# Patient Record
Sex: FEMALE | Race: WHITE | NOT HISPANIC OR LATINO | Employment: UNEMPLOYED | URBAN - METROPOLITAN AREA
[De-identification: names, ages, dates, MRNs, and addresses within clinical notes are randomized per-mention and may not be internally consistent; named-entity substitution may affect disease eponyms.]

---

## 2024-10-23 ENCOUNTER — HOSPITAL ENCOUNTER (EMERGENCY)
Facility: HOSPITAL | Age: 54
End: 2024-10-24
Attending: EMERGENCY MEDICINE

## 2024-10-23 VITALS
OXYGEN SATURATION: 98 % | RESPIRATION RATE: 17 BRPM | TEMPERATURE: 98.1 F | HEART RATE: 81 BPM | DIASTOLIC BLOOD PRESSURE: 60 MMHG | SYSTOLIC BLOOD PRESSURE: 136 MMHG

## 2024-10-23 DIAGNOSIS — R56.9 SEIZURES (HCC): ICD-10-CM

## 2024-10-23 DIAGNOSIS — F10.929 ALCOHOL INTOXICATION (HCC): Primary | ICD-10-CM

## 2024-10-23 DIAGNOSIS — Z87.898 HISTORY OF SEIZURES: ICD-10-CM

## 2024-10-23 DIAGNOSIS — Z00.8 MEDICAL CLEARANCE FOR PSYCHIATRIC ADMISSION: ICD-10-CM

## 2024-10-23 DIAGNOSIS — F41.9 ANXIETY: ICD-10-CM

## 2024-10-23 LAB — ETHANOL EXG-MCNC: 0.23 MG/DL

## 2024-10-23 PROCEDURE — 99285 EMERGENCY DEPT VISIT HI MDM: CPT | Performed by: EMERGENCY MEDICINE

## 2024-10-23 PROCEDURE — 82075 ASSAY OF BREATH ETHANOL: CPT

## 2024-10-23 PROCEDURE — 99284 EMERGENCY DEPT VISIT MOD MDM: CPT

## 2024-10-23 RX ORDER — LORAZEPAM 1 MG/1
1 TABLET ORAL ONCE
Status: COMPLETED | OUTPATIENT
Start: 2024-10-23 | End: 2024-10-23

## 2024-10-23 RX ADMIN — LORAZEPAM 1 MG: 1 TABLET ORAL at 23:42

## 2024-10-23 NOTE — Clinical Note
Tracee COOPER Walters should be transferred out to [facility]  and has been medically cleared.

## 2024-10-24 ENCOUNTER — HOSPITAL ENCOUNTER (INPATIENT)
Facility: HOSPITAL | Age: 54
LOS: 4 days | Discharge: HOME | DRG: 753 | End: 2024-10-28
Attending: STUDENT IN AN ORGANIZED HEALTH CARE EDUCATION/TRAINING PROGRAM | Admitting: STUDENT IN AN ORGANIZED HEALTH CARE EDUCATION/TRAINING PROGRAM
Payer: COMMERCIAL

## 2024-10-24 DIAGNOSIS — R56.9 SEIZURES (HCC): ICD-10-CM

## 2024-10-24 DIAGNOSIS — Z00.8 MEDICAL CLEARANCE FOR PSYCHIATRIC ADMISSION: ICD-10-CM

## 2024-10-24 DIAGNOSIS — F39 MOOD DISORDER (HCC): Primary | ICD-10-CM

## 2024-10-24 DIAGNOSIS — Z72.0 TOBACCO USE: ICD-10-CM

## 2024-10-24 DIAGNOSIS — Z87.898 HISTORY OF SEIZURES: ICD-10-CM

## 2024-10-24 PROBLEM — F41.9 ANXIETY DISORDER: Status: ACTIVE | Noted: 2024-10-24

## 2024-10-24 LAB
25(OH)D3 SERPL-MCNC: 30.3 NG/ML (ref 30–100)
ALBUMIN SERPL BCG-MCNC: 4.4 G/DL (ref 3.5–5)
ALP SERPL-CCNC: 106 U/L (ref 34–104)
ALT SERPL W P-5'-P-CCNC: 8 U/L (ref 7–52)
AMPHETAMINES SERPL QL SCN: NEGATIVE
ANION GAP SERPL CALCULATED.3IONS-SCNC: 6 MMOL/L (ref 4–13)
AST SERPL W P-5'-P-CCNC: 13 U/L (ref 13–39)
BACTERIA UR QL AUTO: NORMAL /HPF
BARBITURATES UR QL: NEGATIVE
BASOPHILS # BLD AUTO: 0.06 THOUSANDS/ΜL (ref 0–0.1)
BASOPHILS NFR BLD AUTO: 1 % (ref 0–1)
BENZODIAZ UR QL: NEGATIVE
BILIRUB SERPL-MCNC: 0.29 MG/DL (ref 0.2–1)
BILIRUB UR QL STRIP: NEGATIVE
BUN SERPL-MCNC: 10 MG/DL (ref 5–25)
CALCIUM SERPL-MCNC: 8.9 MG/DL (ref 8.4–10.2)
CHLORIDE SERPL-SCNC: 104 MMOL/L (ref 96–108)
CLARITY UR: CLEAR
CO2 SERPL-SCNC: 31 MMOL/L (ref 21–32)
COCAINE UR QL: NEGATIVE
COLOR UR: ABNORMAL
CREAT SERPL-MCNC: 0.5 MG/DL (ref 0.6–1.3)
EOSINOPHIL # BLD AUTO: 0.05 THOUSAND/ΜL (ref 0–0.61)
EOSINOPHIL NFR BLD AUTO: 1 % (ref 0–6)
ERYTHROCYTE [DISTWIDTH] IN BLOOD BY AUTOMATED COUNT: 19 % (ref 11.6–15.1)
EST. AVERAGE GLUCOSE BLD GHB EST-MCNC: 111 MG/DL
ETHANOL EXG-MCNC: 0 MG/DL
FENTANYL UR QL SCN: NEGATIVE
FOLATE SERPL-MCNC: 12.3 NG/ML
GFR SERPL CREATININE-BSD FRML MDRD: 108 ML/MIN/1.73SQ M
GLUCOSE SERPL-MCNC: 105 MG/DL (ref 65–140)
GLUCOSE UR STRIP-MCNC: NEGATIVE MG/DL
HBA1C MFR BLD: 5.5 %
HCT VFR BLD AUTO: 37.7 % (ref 34.8–46.1)
HGB BLD-MCNC: 11 G/DL (ref 11.5–15.4)
HGB UR QL STRIP.AUTO: NEGATIVE
HYDROCODONE UR QL SCN: NEGATIVE
IMM GRANULOCYTES # BLD AUTO: 0 THOUSAND/UL (ref 0–0.2)
IMM GRANULOCYTES NFR BLD AUTO: 0 % (ref 0–2)
KETONES UR STRIP-MCNC: NEGATIVE MG/DL
LEUKOCYTE ESTERASE UR QL STRIP: NEGATIVE
LYMPHOCYTES # BLD AUTO: 1.73 THOUSANDS/ΜL (ref 0.6–4.47)
LYMPHOCYTES NFR BLD AUTO: 38 % (ref 14–44)
MCH RBC QN AUTO: 24.8 PG (ref 26.8–34.3)
MCHC RBC AUTO-ENTMCNC: 29.2 G/DL (ref 31.4–37.4)
MCV RBC AUTO: 85 FL (ref 82–98)
METHADONE UR QL: NEGATIVE
MONOCYTES # BLD AUTO: 0.51 THOUSAND/ΜL (ref 0.17–1.22)
MONOCYTES NFR BLD AUTO: 11 % (ref 4–12)
NEUTROPHILS # BLD AUTO: 2.23 THOUSANDS/ΜL (ref 1.85–7.62)
NEUTS SEG NFR BLD AUTO: 49 % (ref 43–75)
NITRITE UR QL STRIP: NEGATIVE
NON-SQ EPI CELLS URNS QL MICRO: NORMAL /HPF
NRBC BLD AUTO-RTO: 0 /100 WBCS
OPIATES UR QL SCN: NEGATIVE
OXYCODONE+OXYMORPHONE UR QL SCN: NEGATIVE
PCP UR QL: NEGATIVE
PH UR STRIP.AUTO: 7 [PH]
PLATELET # BLD AUTO: 322 THOUSANDS/UL (ref 149–390)
PMV BLD AUTO: 8.9 FL (ref 8.9–12.7)
POTASSIUM SERPL-SCNC: 4.4 MMOL/L (ref 3.5–5.3)
PROT SERPL-MCNC: 7 G/DL (ref 6.4–8.4)
PROT UR STRIP-MCNC: ABNORMAL MG/DL
RBC # BLD AUTO: 4.43 MILLION/UL (ref 3.81–5.12)
RBC #/AREA URNS AUTO: NORMAL /HPF
SODIUM SERPL-SCNC: 141 MMOL/L (ref 135–147)
SP GR UR STRIP.AUTO: 1.02 (ref 1–1.03)
THC UR QL: NEGATIVE
TSH SERPL DL<=0.05 MIU/L-ACNC: 1.22 UIU/ML (ref 0.45–4.5)
TSH SERPL DL<=0.05 MIU/L-ACNC: 1.27 UIU/ML (ref 0.45–4.5)
UROBILINOGEN UR STRIP-ACNC: <2 MG/DL
VIT B12 SERPL-MCNC: 112 PG/ML (ref 180–914)
WBC # BLD AUTO: 4.58 THOUSAND/UL (ref 4.31–10.16)
WBC #/AREA URNS AUTO: NORMAL /HPF

## 2024-10-24 PROCEDURE — 36415 COLL VENOUS BLD VENIPUNCTURE: CPT

## 2024-10-24 PROCEDURE — 99245 OFF/OP CONSLTJ NEW/EST HI 55: CPT | Performed by: PSYCHIATRY & NEUROLOGY

## 2024-10-24 PROCEDURE — 82306 VITAMIN D 25 HYDROXY: CPT | Performed by: PSYCHIATRY & NEUROLOGY

## 2024-10-24 PROCEDURE — 80053 COMPREHEN METABOLIC PANEL: CPT

## 2024-10-24 PROCEDURE — 82075 ASSAY OF BREATH ETHANOL: CPT | Performed by: EMERGENCY MEDICINE

## 2024-10-24 PROCEDURE — 82746 ASSAY OF FOLIC ACID SERUM: CPT | Performed by: PSYCHIATRY & NEUROLOGY

## 2024-10-24 PROCEDURE — 83036 HEMOGLOBIN GLYCOSYLATED A1C: CPT | Performed by: PSYCHIATRY & NEUROLOGY

## 2024-10-24 PROCEDURE — 81001 URINALYSIS AUTO W/SCOPE: CPT

## 2024-10-24 PROCEDURE — 80307 DRUG TEST PRSMV CHEM ANLYZR: CPT

## 2024-10-24 PROCEDURE — 85025 COMPLETE CBC W/AUTO DIFF WBC: CPT

## 2024-10-24 PROCEDURE — 84443 ASSAY THYROID STIM HORMONE: CPT

## 2024-10-24 PROCEDURE — 82607 VITAMIN B-12: CPT | Performed by: PSYCHIATRY & NEUROLOGY

## 2024-10-24 PROCEDURE — 84443 ASSAY THYROID STIM HORMONE: CPT | Performed by: PSYCHIATRY & NEUROLOGY

## 2024-10-24 RX ORDER — PROPRANOLOL HYDROCHLORIDE 10 MG/1
10 TABLET ORAL EVERY 8 HOURS PRN
Status: CANCELLED | OUTPATIENT
Start: 2024-10-24

## 2024-10-24 RX ORDER — LEVETIRACETAM 500 MG/1
500 TABLET ORAL ONCE
Status: COMPLETED | OUTPATIENT
Start: 2024-10-24 | End: 2024-10-24

## 2024-10-24 RX ORDER — TRAZODONE HYDROCHLORIDE 50 MG/1
50 TABLET, FILM COATED ORAL
Status: CANCELLED | OUTPATIENT
Start: 2024-10-24

## 2024-10-24 RX ORDER — BENZTROPINE MESYLATE 1 MG/1
1 TABLET ORAL
Status: DISCONTINUED | OUTPATIENT
Start: 2024-10-24 | End: 2024-10-28 | Stop reason: HOSPADM

## 2024-10-24 RX ORDER — OLANZAPINE 10 MG/1
10 TABLET ORAL
Status: DISCONTINUED | OUTPATIENT
Start: 2024-10-24 | End: 2024-10-28 | Stop reason: HOSPADM

## 2024-10-24 RX ORDER — BENZTROPINE MESYLATE 1 MG/1
1 TABLET ORAL
Status: CANCELLED | OUTPATIENT
Start: 2024-10-24

## 2024-10-24 RX ORDER — OLANZAPINE 10 MG/2ML
10 INJECTION, POWDER, FOR SOLUTION INTRAMUSCULAR
Status: CANCELLED | OUTPATIENT
Start: 2024-10-24

## 2024-10-24 RX ORDER — POLYETHYLENE GLYCOL 3350 17 G/17G
17 POWDER, FOR SOLUTION ORAL DAILY PRN
Status: DISCONTINUED | OUTPATIENT
Start: 2024-10-24 | End: 2024-10-28 | Stop reason: HOSPADM

## 2024-10-24 RX ORDER — HYDROXYZINE HYDROCHLORIDE 50 MG/1
100 TABLET, FILM COATED ORAL
Status: DISCONTINUED | OUTPATIENT
Start: 2024-10-24 | End: 2024-10-28 | Stop reason: HOSPADM

## 2024-10-24 RX ORDER — HYDROXYZINE HYDROCHLORIDE 25 MG/1
50 TABLET, FILM COATED ORAL ONCE
Status: COMPLETED | OUTPATIENT
Start: 2024-10-24 | End: 2024-10-24

## 2024-10-24 RX ORDER — ACETAMINOPHEN 325 MG/1
650 TABLET ORAL EVERY 6 HOURS PRN
Status: DISCONTINUED | OUTPATIENT
Start: 2024-10-24 | End: 2024-10-28 | Stop reason: HOSPADM

## 2024-10-24 RX ORDER — PROPRANOLOL HYDROCHLORIDE 10 MG/1
10 TABLET ORAL EVERY 8 HOURS PRN
Status: DISCONTINUED | OUTPATIENT
Start: 2024-10-24 | End: 2024-10-28 | Stop reason: HOSPADM

## 2024-10-24 RX ORDER — LORAZEPAM 2 MG/ML
2 INJECTION INTRAMUSCULAR EVERY 6 HOURS PRN
Status: CANCELLED | OUTPATIENT
Start: 2024-10-24

## 2024-10-24 RX ORDER — HYDROXYZINE HYDROCHLORIDE 25 MG/1
25 TABLET, FILM COATED ORAL
Status: DISCONTINUED | OUTPATIENT
Start: 2024-10-24 | End: 2024-10-28 | Stop reason: HOSPADM

## 2024-10-24 RX ORDER — HYDROXYZINE HYDROCHLORIDE 25 MG/1
50 TABLET, FILM COATED ORAL
Status: CANCELLED | OUTPATIENT
Start: 2024-10-24

## 2024-10-24 RX ORDER — LORAZEPAM 2 MG/ML
2 INJECTION INTRAMUSCULAR EVERY 6 HOURS PRN
Status: DISCONTINUED | OUTPATIENT
Start: 2024-10-24 | End: 2024-10-28 | Stop reason: HOSPADM

## 2024-10-24 RX ORDER — OLANZAPINE 2.5 MG/1
2.5 TABLET, FILM COATED ORAL
Status: CANCELLED | OUTPATIENT
Start: 2024-10-24

## 2024-10-24 RX ORDER — HYDROXYZINE HYDROCHLORIDE 25 MG/1
100 TABLET, FILM COATED ORAL
Status: CANCELLED | OUTPATIENT
Start: 2024-10-24

## 2024-10-24 RX ORDER — MAGNESIUM HYDROXIDE/ALUMINUM HYDROXICE/SIMETHICONE 120; 1200; 1200 MG/30ML; MG/30ML; MG/30ML
30 SUSPENSION ORAL EVERY 4 HOURS PRN
Status: DISCONTINUED | OUTPATIENT
Start: 2024-10-24 | End: 2024-10-28 | Stop reason: HOSPADM

## 2024-10-24 RX ORDER — OLANZAPINE 10 MG/2ML
5 INJECTION, POWDER, FOR SOLUTION INTRAMUSCULAR
Status: DISCONTINUED | OUTPATIENT
Start: 2024-10-24 | End: 2024-10-28 | Stop reason: HOSPADM

## 2024-10-24 RX ORDER — AMOXICILLIN 250 MG
1 CAPSULE ORAL DAILY PRN
Status: DISCONTINUED | OUTPATIENT
Start: 2024-10-24 | End: 2024-10-28 | Stop reason: HOSPADM

## 2024-10-24 RX ORDER — OLANZAPINE 5 MG/1
5 TABLET ORAL
Status: DISCONTINUED | OUTPATIENT
Start: 2024-10-24 | End: 2024-10-28 | Stop reason: HOSPADM

## 2024-10-24 RX ORDER — POLYETHYLENE GLYCOL 3350 17 G/17G
17 POWDER, FOR SOLUTION ORAL DAILY PRN
Status: CANCELLED | OUTPATIENT
Start: 2024-10-24

## 2024-10-24 RX ORDER — OLANZAPINE 10 MG/2ML
10 INJECTION, POWDER, FOR SOLUTION INTRAMUSCULAR
Status: DISCONTINUED | OUTPATIENT
Start: 2024-10-24 | End: 2024-10-28 | Stop reason: HOSPADM

## 2024-10-24 RX ORDER — BISACODYL 10 MG
10 SUPPOSITORY, RECTAL RECTAL DAILY PRN
Status: CANCELLED | OUTPATIENT
Start: 2024-10-24

## 2024-10-24 RX ORDER — HYDROXYZINE HYDROCHLORIDE 50 MG/1
50 TABLET, FILM COATED ORAL
Status: DISCONTINUED | OUTPATIENT
Start: 2024-10-24 | End: 2024-10-28 | Stop reason: HOSPADM

## 2024-10-24 RX ORDER — LEVETIRACETAM 500 MG/1
500 TABLET ORAL EVERY 12 HOURS SCHEDULED
Qty: 62 TABLET | Refills: 0 | Status: ON HOLD | OUTPATIENT
Start: 2024-10-24 | End: 2024-11-24

## 2024-10-24 RX ORDER — OLANZAPINE 2.5 MG/1
5 TABLET, FILM COATED ORAL
Status: CANCELLED | OUTPATIENT
Start: 2024-10-24

## 2024-10-24 RX ORDER — LEVETIRACETAM 500 MG/1
500 TABLET ORAL EVERY 12 HOURS SCHEDULED
Status: CANCELLED | OUTPATIENT
Start: 2024-10-24

## 2024-10-24 RX ORDER — BENZTROPINE MESYLATE 1 MG/ML
1 INJECTION, SOLUTION INTRAMUSCULAR; INTRAVENOUS
Status: DISCONTINUED | OUTPATIENT
Start: 2024-10-24 | End: 2024-10-28 | Stop reason: HOSPADM

## 2024-10-24 RX ORDER — HYDROXYZINE HYDROCHLORIDE 25 MG/1
25 TABLET, FILM COATED ORAL
Status: CANCELLED | OUTPATIENT
Start: 2024-10-24

## 2024-10-24 RX ORDER — DIPHENHYDRAMINE HYDROCHLORIDE 50 MG/ML
50 INJECTION INTRAMUSCULAR; INTRAVENOUS EVERY 6 HOURS PRN
Status: DISCONTINUED | OUTPATIENT
Start: 2024-10-24 | End: 2024-10-28 | Stop reason: HOSPADM

## 2024-10-24 RX ORDER — ACETAMINOPHEN 325 MG/1
975 TABLET ORAL EVERY 6 HOURS PRN
Status: CANCELLED | OUTPATIENT
Start: 2024-10-24

## 2024-10-24 RX ORDER — LEVETIRACETAM 500 MG/1
500 TABLET ORAL EVERY 12 HOURS SCHEDULED
Status: DISCONTINUED | OUTPATIENT
Start: 2024-10-24 | End: 2024-10-28 | Stop reason: HOSPADM

## 2024-10-24 RX ORDER — DIPHENHYDRAMINE HYDROCHLORIDE 50 MG/ML
50 INJECTION INTRAMUSCULAR; INTRAVENOUS EVERY 6 HOURS PRN
Status: CANCELLED | OUTPATIENT
Start: 2024-10-24

## 2024-10-24 RX ORDER — BISACODYL 10 MG
10 SUPPOSITORY, RECTAL RECTAL DAILY PRN
Status: DISCONTINUED | OUTPATIENT
Start: 2024-10-24 | End: 2024-10-28 | Stop reason: HOSPADM

## 2024-10-24 RX ORDER — OLANZAPINE 10 MG/1
10 TABLET ORAL
Status: CANCELLED | OUTPATIENT
Start: 2024-10-24

## 2024-10-24 RX ORDER — BENZTROPINE MESYLATE 1 MG/ML
1 INJECTION, SOLUTION INTRAMUSCULAR; INTRAVENOUS
Status: CANCELLED | OUTPATIENT
Start: 2024-10-24

## 2024-10-24 RX ORDER — ACETAMINOPHEN 325 MG/1
650 TABLET ORAL EVERY 4 HOURS PRN
Status: DISCONTINUED | OUTPATIENT
Start: 2024-10-24 | End: 2024-10-28 | Stop reason: HOSPADM

## 2024-10-24 RX ORDER — ACETAMINOPHEN 325 MG/1
975 TABLET ORAL EVERY 6 HOURS PRN
Status: DISCONTINUED | OUTPATIENT
Start: 2024-10-24 | End: 2024-10-28 | Stop reason: HOSPADM

## 2024-10-24 RX ORDER — LANOLIN ALCOHOL/MO/W.PET/CERES
100 CREAM (GRAM) TOPICAL DAILY
Status: DISCONTINUED | OUTPATIENT
Start: 2024-10-25 | End: 2024-10-28 | Stop reason: HOSPADM

## 2024-10-24 RX ORDER — LORAZEPAM 1 MG/1
1 TABLET ORAL ONCE
Status: COMPLETED | OUTPATIENT
Start: 2024-10-24 | End: 2024-10-24

## 2024-10-24 RX ORDER — OLANZAPINE 10 MG/2ML
5 INJECTION, POWDER, FOR SOLUTION INTRAMUSCULAR
Status: CANCELLED | OUTPATIENT
Start: 2024-10-24

## 2024-10-24 RX ORDER — ACETAMINOPHEN 325 MG/1
650 TABLET ORAL EVERY 6 HOURS PRN
Status: CANCELLED | OUTPATIENT
Start: 2024-10-24

## 2024-10-24 RX ORDER — OLANZAPINE 2.5 MG/1
2.5 TABLET, FILM COATED ORAL
Status: DISCONTINUED | OUTPATIENT
Start: 2024-10-24 | End: 2024-10-28 | Stop reason: HOSPADM

## 2024-10-24 RX ORDER — FOLIC ACID 1 MG/1
1 TABLET ORAL DAILY
Status: DISCONTINUED | OUTPATIENT
Start: 2024-10-25 | End: 2024-10-28 | Stop reason: HOSPADM

## 2024-10-24 RX ORDER — AMOXICILLIN 250 MG
1 CAPSULE ORAL DAILY PRN
Status: CANCELLED | OUTPATIENT
Start: 2024-10-24

## 2024-10-24 RX ORDER — ACETAMINOPHEN 325 MG/1
650 TABLET ORAL EVERY 4 HOURS PRN
Status: CANCELLED | OUTPATIENT
Start: 2024-10-24

## 2024-10-24 RX ORDER — MAGNESIUM HYDROXIDE/ALUMINUM HYDROXICE/SIMETHICONE 120; 1200; 1200 MG/30ML; MG/30ML; MG/30ML
30 SUSPENSION ORAL EVERY 4 HOURS PRN
Status: CANCELLED | OUTPATIENT
Start: 2024-10-24

## 2024-10-24 RX ORDER — TRAZODONE HYDROCHLORIDE 50 MG/1
50 TABLET, FILM COATED ORAL
Status: DISCONTINUED | OUTPATIENT
Start: 2024-10-24 | End: 2024-10-28 | Stop reason: HOSPADM

## 2024-10-24 RX ADMIN — LEVETIRACETAM 500 MG: 500 TABLET, FILM COATED ORAL at 21:29

## 2024-10-24 RX ADMIN — LORAZEPAM 1 MG: 1 TABLET ORAL at 07:36

## 2024-10-24 RX ADMIN — NICOTINE 7 MG: 7 PATCH, EXTENDED RELEASE TRANSDERMAL at 07:36

## 2024-10-24 RX ADMIN — HYDROXYZINE HYDROCHLORIDE 100 MG: 50 TABLET, FILM COATED ORAL at 21:33

## 2024-10-24 RX ADMIN — TRAZODONE HYDROCHLORIDE 50 MG: 50 TABLET ORAL at 21:29

## 2024-10-24 RX ADMIN — NICOTINE POLACRILEX 4 MG: 4 GUM, CHEWING BUCCAL at 20:26

## 2024-10-24 RX ADMIN — HYDROXYZINE HYDROCHLORIDE 50 MG: 25 TABLET, FILM COATED ORAL at 14:20

## 2024-10-24 RX ADMIN — LEVETIRACETAM 500 MG: 500 TABLET, FILM COATED ORAL at 14:21
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
## 2024-10-24 NOTE — PLAN OF CARE
Problem: Depression  Goal: Treatment Goal: Demonstrate behavioral control of depressive symptoms, verbalize feelings of improved mood/affect, and adopt new coping skills prior to discharge  Outcome: Progressing     Problem: Anxiety  Goal: Anxiety is at manageable level  Description: Interventions:  - Assess and monitor patient's anxiety level.   - Monitor for signs and symptoms (heart palpitations, chest pain, shortness of breath, headaches, nausea, feeling jumpy, restlessness, irritable, apprehensive).   - Collaborate with interdisciplinary team and initiate plan and interventions as ordered.  - [place] patient to unit/surroundings  - Explain treatment plan  - Encourage participation in care  - Encourage verbalization of concerns/fears  - Identify coping mechanisms  - Assist in developing anxiety-reducing skills  - Administer/offer alternative therapies  - Limit or eliminate stimulants  Outcome: Progressing
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
## 2024-10-24 NOTE — DISCHARGE INSTRUCTIONS
You were seen in the emergency department for alcohol intoxication.  Please follow-up with your primary care doctor as soon as possible for reevaluation.  If you do not have a primary care doctor, a number for family medicine is included in this discharge paperwork.  Please follow-up with a neurologist soon as possible for a prescription of your Keppra.  Please return to the emergency department should you experience any chest pain, shortness of breath, abdominal pain, or any other concerning symptoms that you would like evaluated.

## 2024-10-24 NOTE — ED CARE HANDOFF
Emergency Department Sign Out Note                ED Course as of 10/24/24 1320   Thu Oct 24, 2024   0855 Patient is currently sober, no suicidal homicidal ideation, the patient is requesting benzodiazepines to be discharged with, do not feel comfortable given the risk of abuse.  Patient is requesting inpatient psychiatric treatment there are no grounds for inpatient psychiatric treatment at this point in time, recommended outpatient psychiatric treatment as well as follow-up with rehabilitation and drug and alcohol counseling, patient declined.  Will contact crisis worker and have psychiatry consult in the emergency department to determine if the need for inpatient treatment is necessary.  Patient is medically cleared   1116 Pending in person psych consult      1223 Patient is medically cleared for inpatient behavioral health        Procedures  Medical Decision Making  Amount and/or Complexity of Data Reviewed  Labs: ordered.    Risk  OTC drugs.  Prescription drug management.  Decision regarding hospitalization.            Disposition  Final diagnoses:   Alcohol intoxication (HCC)   History of seizures   Anxiety     Time reflects when diagnosis was documented in both MDM as applicable and the Disposition within this note       Time User Action Codes Description Comment    10/24/2024  6:34 AM Edis Croft Add [F10.929] Alcohol intoxication (HCC)     10/24/2024  6:34 AM Edis Croft Add [Z87.898] History of seizures     10/24/2024  8:54 AM Steve Cerrato Add [F41.9] Anxiety     10/24/2024  1:06 PM Xiomy Moreno Add [R56.9] Seizures (HCC)     10/24/2024  1:06 PM Xiomy Moreno Add [Z00.8] Medical clearance for psychiatric admission           ED Disposition       ED Disposition   Transfer to Behavioral Health    [facility]   --    Date/Time   Thu Oct 24, 2024  1:16 PM    Comment   Tracee Walters should be transferred out to [facility]  and has been medically cleared.               MD Documentation      Flowsheet  Row Most Recent Value   Patient Condition The patient has been stabilized such that within reasonable medical probability, no material deterioration of the patient condition or the condition of the unborn child(graeme) is likely to result from the transfer   Reason for Transfer Patient/Family request, Level of Care needed not available at this facility, No bed available at level of patient's needs   Benefits of Transfer Patient preference, Continuity of care   Risks of Transfer Potential deterioration of medical condition, Possible worsening of condition or death during transfer, Potential for delay in receiving treatment, Loss of IV   Accepting Physician Dr Amador   Accepting Facility Name, [facility]   Sending MD Cerrato   Provider Certification General risk, such as traffic hazards, adverse weather conditions, rough terrain or turbulence, possible failure of equipment (including vehicle or aircraft), or consequences of actions of persons outside the control of the transport personnel, Risk of worsening condition, Unanticipated needs of medical equipment and personnel during transport          RN Documentation      Flowsheet Row Most Recent Value   Accepting Facility Name, [facility]          Follow-up Information       Follow up With Specialties Details Why Contact Info Additional Information    [facility] Family Medicine Schedule an appointment as soon as possible for a visit in 1 day  [address] [address]  [phone] [facility], [address] [facility], [address], [id number] [phone]    [facility] Emergency Department Emergency Medicine Go to  If symptoms worsen [address] [facility] [address]  [phone] [facility] Emergency Department, [address] [place], [place], [address]   [phone]    [facility] Neurology Pod  Neurology Call today  [address] [facility] [address]           Patient's Medications   Discharge Prescriptions    LEVETIRACETAM (KEPPRA) 500 MG TABLET    Take 1 tablet (500 mg total) by mouth every 12 (twelve) hours       Start Date: 10/24/2024End Date: 11/24/2024       Order Dose: 500 mg       Quantity: 62 tablet    Refills: 0            ED Provider  Electronically Signed by     Steve Cerrato DO  10/24/24 7108

## 2024-10-24 NOTE — ED CARE HANDOFF
Emergency Department Sign Out Note        Sign out and transfer of care from Dr. Crawford. See Separate Emergency Department note.     The patient, Tracee Walters, was evaluated by the previous provider for alcohol intoxication.    Workup Completed:  Breathalyzer    ED Course / Workup Pending (followup):  Patient spent the night in the ED resting. Her vital signs remained stable and the patient was calm after she was given 1mg of valium. Patient rested comfortably through the night.  In the morning when the patient woke up, her son was called.  The patient's son had concerns that the patient was acting manic, abusing alcohol, and felt that she would benefit from a psychiatric hospitalization.  The patient agreed with this assessment, and felt that a voluntary psychiatric hospitalization would benefit her.  At this time I do not have any grounds to 302 the patient.  But we will order Aminta psych workup and consult crisis to evaluate the patient's eligibility for psychiatric hospitalization.  See ED course for further documentation.                                  ED Course as of 10/24/24 1420   Wed Oct 23, 2024   2213 SO: intoxicated, son concerned for SI, when sober needs to be reevaluated for SI and son wants to be called to talk with her, lots of home stressors (lost home,  suicided) offered 201 and/or rehab/host referral. Son wants to talk with her once sober   Thu Oct 24, 2024   0342 Patient evaluated.  Patient is sleeping comfortably in her bed.  Patient's vital signs stable.   0633 Patient awake and sober. Patient notes that she does not remember anything that happened last night. The patient denies any current SI/HI. Patient notes that she has a hx of seizures and has not been able to fill her Keppra. She is requesting a script until she can get into her neurologist. Patient is going to call her son.   0647 Son requested crisis evaluation because the patient has been manic and drinking a lot. The  patient is interested in inpatient psychiatric treatment. Crisis contacted for evaluation.   0700 Patient is being signed out to the day team at this time who will continue her care and obtain crisis evaluation for potential voluntary inpatient psychiatric admission.     Procedures  Medical Decision Making  Amount and/or Complexity of Data Reviewed  Labs: ordered.    Risk  OTC drugs.  Prescription drug management.  Decision regarding hospitalization.            Disposition  Final diagnoses:   Alcohol intoxication (HCC)   History of seizures   Anxiety     Time reflects when diagnosis was documented in both MDM as applicable and the Disposition within this note       Time User Action Codes Description Comment    10/24/2024  6:34 AM Edis Croft Add [F10.929] Alcohol intoxication (HCC)     10/24/2024  6:34 AM Edis Croft Add [Z87.898] History of seizures     10/24/2024  8:54 AM Steve Cerrato Add [F41.9] Anxiety     10/24/2024  1:06 PM Xiomy Moreno Add [R56.9] Seizures (HCC)     10/24/2024  1:06 PM Xiomy Moreno Add [Z00.8] Medical clearance for psychiatric admission           ED Disposition       ED Disposition   Transfer to Behavioral Health Condition   --    Date/Time   Thu Oct 24, 2024  1:16 PM    Comment   Tracee Walters should be transferred out to [facility]  and has been medically cleared.               MD Documentation      Flowsheet Row Most Recent Value   Patient Condition The patient has been stabilized such that within reasonable medical probability, no material deterioration of the patient condition or the condition of the unborn child(graeme) is likely to result from the transfer   Reason for Transfer Patient/Family request, Level of Care needed not available at this facility, No bed available at level of patient's needs   Benefits of Transfer Patient preference, Continuity of care   Risks of Transfer Potential deterioration of medical condition, Possible worsening of condition or death during  transfer, Potential for delay in receiving treatment, Loss of IV   Accepting Physician Dr Amador   Accepting Facility Name, [facility]   Transported by (Company and Unit #) [employer]   Sending MD Cerrato   Provider Certification General risk, such as traffic hazards, adverse weather conditions, rough terrain or turbulence, possible failure of equipment (including vehicle or aircraft), or consequences of actions of persons outside the control of the transport personnel, Risk of worsening condition, Unanticipated needs of medical equipment and personnel during transport          RN Documentation      Flowsheet Row Most Recent Value   Accepting Facility Name, [facility]   Transported by (Company and Unit #) [employer]          Follow-up Information       Follow up With Specialties Details Why Contact Info Additional Information    [facility] Family Medicine Schedule an appointment as soon as possible for a visit in 1 day  [address] [address] [phone] [facility], [address], [address] [phone]    [facility] Emergency Department Emergency Medicine Go to  If symptoms worsen [address] [facility] [address]  [phone] [facility] Emergency Department, [address] [place], [place], [address]   [phone]    [facility] Neurology Pod Neurology Call today  [address] [facility] [address]           Patient's Medications   Discharge Prescriptions    LEVETIRACETAM (KEPPRA) 500 MG TABLET    Take 1 tablet (500 mg total) by mouth every 12 (twelve) hours       Start Date: 10/24/2024End Date: 11/24/2024       Order Dose: 500 mg       Quantity: 62 tablet    Refills: 0            ED Provider  Electronically Signed by     Edis Croft DO  10/24/24 3498
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
## 2024-10-24 NOTE — ED NOTES
Insurance Authorization for admission:     Per EVS, pt is not on file. Pt stated she does not have insurance and there is nothing in media.
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
## 2024-10-24 NOTE — EMTALA/ACUTE CARE TRANSFER
[facility] EMERGENCY DEPARTMENT  [address] [address]  Dept: [phone]      EMTALA TRANSFER CONSENT    NAME Tracee Walters                                         1970                              MRN [id number]    I have been informed of my rights regarding examination, treatment, and transfer   by Dr. Steve Cerrato DO    Benefits: Patient preference, Continuity of care    Risks: Potential deterioration of medical condition, Possible worsening of condition or death during transfer, Potential for delay in receiving treatment, Loss of IV      Consent for Transfer:  I acknowledge that my medical condition has been evaluated and explained to me by the emergency department physician or other qualified medical person and/or my attending physician, who has recommended that I be transferred to the service of  Accepting Physician: Dr Amador at Accepting Facility Name, City & State : [facility]. The above potential benefits of such transfer, the potential risks associated with such transfer, and the probable risks of not being transferred have been explained to me, and I fully understand them.  The doctor has explained that, in my case, the benefits of transfer outweigh the risks.  I agree to be transferred.    I authorize the performance of emergency medical procedures and treatments upon me in both transit and upon arrival at the receiving facility.  Additionally, I authorize the release of any and all medical records to the receiving facility and request they be transported with me, if possible.  I understand that the safest mode of transportation during a medical emergency is an ambulance and that the Hospital advocates the use of this mode of transport. Risks of traveling to the receiving facility by car, including absence of medical control, life sustaining equipment, such as oxygen, and medical personnel has been explained to me and I fully understand  them.    (YULI CORRECT BOX BELOW)  [  ]  I consent to the stated transfer and to be transported by ambulance/helicopter.  [  ]  I consent to the stated transfer, but refuse transportation by ambulance and accept full responsibility for my transportation by car.  I understand the risks of non-ambulance transfers and I exonerate the Hospital and its staff from any deterioration in my condition that results from this refusal.    X___________________________________________    DATE  10/24/24  TIME________  Signature of patient or legally responsible individual signing on patient behalf           RELATIONSHIP TO PATIENT_________________________          Provider Certification    NAME Tracee Walters                                         1970                              MRN [id number]    A medical screening exam was performed on the above named patient.  Based on the examination:    Condition Necessitating Transfer The primary encounter diagnosis was Alcohol intoxication (HCC). Diagnoses of History of seizures, Anxiety, Seizures (HCC), and Medical clearance for psychiatric admission were also pertinent to this visit.    Patient Condition: The patient has been stabilized such that within reasonable medical probability, no material deterioration of the patient condition or the condition of the unborn child(graeme) is likely to result from the transfer    Reason for Transfer: Patient/Family request, Level of Care needed not available at this facility, No bed available at level of patient's needs    Transfer Requirements: [facility]   Space available and qualified personnel available for treatment as acknowledged by    Agreed to accept transfer and to provide appropriate medical treatment as acknowledged by       Dr Amador  Appropriate medical records of the examination and treatment of the patient are provided at the time of transfer   STAFF INITIAL WHEN COMPLETED _______  Transfer will be performed by  qualified personnel from    and appropriate transfer equipment as required, including the use of necessary and appropriate life support measures.    Provider Certification: I have examined the patient and explained the following risks and benefits of being transferred/refusing transfer to the patient/family:  General risk, such as traffic hazards, adverse weather conditions, rough terrain or turbulence, possible failure of equipment (including vehicle or aircraft), or consequences of actions of persons outside the control of the transport personnel, Risk of worsening condition, Unanticipated needs of medical equipment and personnel during transport      Based on these reasonable risks and benefits to the patient and/or the unborn child(graeme), and based upon the information available at the time of the patient’s examination, I certify that the medical benefits reasonably to be expected from the provision of appropriate medical treatments at another medical facility outweigh the increasing risks, if any, to the individual’s medical condition, and in the case of labor to the unborn child, from effecting the transfer.    X____________________________________________ DATE 10/24/24        TIME_______      ORIGINAL - SEND TO MEDICAL RECORDS   COPY - SEND WITH PATIENT DURING TRANSFER
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
## 2024-10-24 NOTE — ED NOTES
Patient is accepted at [facility]  Patient is accepted by Dr. Marjorie Calderon     Waiting for transport time      Nurse report is to be called to [phone] prior to patient transfer.
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
## 2024-10-24 NOTE — ED NOTES
Report to paty nguyen. Belongings given to CTS and patient left in stable condition       Nataliia Lowry RN  10/24/24 0490

## 2024-10-25 PROBLEM — G40.909 SEIZURE DISORDER (HCC): Status: ACTIVE | Noted: 2024-10-25

## 2024-10-25 PROBLEM — Z00.8 MEDICAL CLEARANCE FOR PSYCHIATRIC ADMISSION: Status: ACTIVE | Noted: 2024-10-25

## 2024-10-25 PROBLEM — Z72.0 TOBACCO USE: Status: ACTIVE | Noted: 2024-10-25

## 2024-10-25 PROBLEM — L68.0 HIRSUTISM: Status: ACTIVE | Noted: 2024-10-25

## 2024-10-25 PROBLEM — F10.10 ALCOHOL ABUSE: Status: ACTIVE | Noted: 2024-10-25

## 2024-10-25 PROBLEM — F39 MOOD DISORDER (HCC): Status: ACTIVE | Noted: 2024-10-25

## 2024-10-25 LAB
APTT PPP: 27 SECONDS (ref 23–34)
CHOLEST SERPL-MCNC: 169 MG/DL
FIBRINOGEN PPP-MCNC: 268 MG/DL (ref 206–523)
HDLC SERPL-MCNC: 92 MG/DL
INR PPP: 0.92 (ref 0.85–1.19)
LDLC SERPL CALC-MCNC: 67 MG/DL (ref 0–100)
NONHDLC SERPL-MCNC: 77 MG/DL
PLATELET # BLD AUTO: 285 THOUSANDS/UL (ref 149–390)
PMV BLD AUTO: 11 FL (ref 8.9–12.7)
PROTHROMBIN TIME: 12.9 SECONDS (ref 12.3–15)
THROMBIN TIME: 16.2 SECONDS (ref 14.7–18.4)
TREPONEMA PALLIDUM IGG+IGM AB [PRESENCE] IN SERUM OR PLASMA BY IMMUNOASSAY: NORMAL
TRIGL SERPL-MCNC: 49 MG/DL

## 2024-10-25 PROCEDURE — 85384 FIBRINOGEN ACTIVITY: CPT | Performed by: PSYCHIATRY & NEUROLOGY

## 2024-10-25 PROCEDURE — 85049 AUTOMATED PLATELET COUNT: CPT | Performed by: PSYCHIATRY & NEUROLOGY

## 2024-10-25 PROCEDURE — 85732 THROMBOPLASTIN TIME PARTIAL: CPT | Performed by: PSYCHIATRY & NEUROLOGY

## 2024-10-25 PROCEDURE — 99223 1ST HOSP IP/OBS HIGH 75: CPT | Performed by: PSYCHIATRY & NEUROLOGY

## 2024-10-25 PROCEDURE — 85670 THROMBIN TIME PLASMA: CPT | Performed by: PSYCHIATRY & NEUROLOGY

## 2024-10-25 PROCEDURE — 86780 TREPONEMA PALLIDUM: CPT | Performed by: PSYCHIATRY & NEUROLOGY

## 2024-10-25 PROCEDURE — 85611 PROTHROMBIN TEST: CPT | Performed by: PSYCHIATRY & NEUROLOGY

## 2024-10-25 PROCEDURE — 80061 LIPID PANEL: CPT | Performed by: PSYCHIATRY & NEUROLOGY

## 2024-10-25 PROCEDURE — 85730 THROMBOPLASTIN TIME PARTIAL: CPT | Performed by: PSYCHIATRY & NEUROLOGY

## 2024-10-25 PROCEDURE — 85610 PROTHROMBIN TIME: CPT | Performed by: PSYCHIATRY & NEUROLOGY

## 2024-10-25 RX ORDER — NICOTINE 21 MG/24HR
14 PATCH, TRANSDERMAL 24 HOURS TRANSDERMAL DAILY
Status: DISCONTINUED | OUTPATIENT
Start: 2024-10-25 | End: 2024-10-28 | Stop reason: HOSPADM

## 2024-10-25 RX ORDER — OLANZAPINE 5 MG/1
5 TABLET ORAL
Status: DISCONTINUED | OUTPATIENT
Start: 2024-10-25 | End: 2024-10-26

## 2024-10-25 RX ORDER — LIDOCAINE 50 MG/G
2 PATCH TOPICAL DAILY
Status: DISCONTINUED | OUTPATIENT
Start: 2024-10-25 | End: 2024-10-28 | Stop reason: HOSPADM

## 2024-10-25 RX ORDER — TRAZODONE HYDROCHLORIDE 50 MG/1
50 TABLET, FILM COATED ORAL
Status: DISCONTINUED | OUTPATIENT
Start: 2024-10-25 | End: 2024-10-28 | Stop reason: HOSPADM

## 2024-10-25 RX ORDER — HYDROXYZINE HYDROCHLORIDE 50 MG/1
50 TABLET, FILM COATED ORAL ONCE
Status: COMPLETED | OUTPATIENT
Start: 2024-10-25 | End: 2024-10-25

## 2024-10-25 RX ADMIN — LIDOCAINE 2 PATCH: 50 PATCH CUTANEOUS at 08:53

## 2024-10-25 RX ADMIN — NICOTINE 14 MG: 14 PATCH, EXTENDED RELEASE TRANSDERMAL at 09:01

## 2024-10-25 RX ADMIN — LEVETIRACETAM 500 MG: 500 TABLET, FILM COATED ORAL at 21:18

## 2024-10-25 RX ADMIN — TRAZODONE HYDROCHLORIDE 50 MG: 50 TABLET ORAL at 21:18

## 2024-10-25 RX ADMIN — HYDROXYZINE HYDROCHLORIDE 50 MG: 50 TABLET, FILM COATED ORAL at 21:22

## 2024-10-25 RX ADMIN — Medication 100 MG: at 08:48

## 2024-10-25 RX ADMIN — LEVETIRACETAM 500 MG: 500 TABLET, FILM COATED ORAL at 08:48

## 2024-10-25 RX ADMIN — ACETAMINOPHEN 975 MG: 325 TABLET, FILM COATED ORAL at 19:46

## 2024-10-25 RX ADMIN — OLANZAPINE 5 MG: 5 TABLET, FILM COATED ORAL at 21:18

## 2024-10-25 RX ADMIN — HYDROXYZINE HYDROCHLORIDE 50 MG: 50 TABLET, FILM COATED ORAL at 12:00

## 2024-10-25 RX ADMIN — ACETAMINOPHEN 975 MG: 325 TABLET, FILM COATED ORAL at 08:25

## 2024-10-25 RX ADMIN — MULTIPLE VITAMINS W/ MINERALS TAB 1 TABLET: TAB ORAL at 08:48

## 2024-10-25 RX ADMIN — HYDROXYZINE HYDROCHLORIDE 50 MG: 50 TABLET, FILM COATED ORAL at 13:29

## 2024-10-25 RX ADMIN — FOLIC ACID 1 MG: 1 TABLET ORAL at 08:48

## 2024-10-25 NOTE — NURSING NOTE
Educated on SSW and effects of ETOH on body, pleasant and cooperative, denies SI AVH , will come to staff if feeling unsafe or has SSW, staff will monitor for withdrawal symptoms

## 2024-10-25 NOTE — PLAN OF CARE
Problem: Depression  Goal: Treatment Goal: Demonstrate behavioral control of depressive symptoms, verbalize feelings of improved mood/affect, and adopt new coping skills prior to discharge  Outcome: Progressing     Problem: Anxiety  Goal: Anxiety is at manageable level  Description: Interventions:  - Assess and monitor patient's anxiety level.   - Monitor for signs and symptoms (heart palpitations, chest pain, shortness of breath, headaches, nausea, feeling jumpy, restlessness, irritable, apprehensive).   - Collaborate with interdisciplinary team and initiate plan and interventions as ordered.  - [place] patient to unit/surroundings  - Explain treatment plan  - Encourage participation in care  - Encourage verbalization of concerns/fears  - Identify coping mechanisms  - Assist in developing anxiety-reducing skills  - Administer/offer alternative therapies  - Limit or eliminate stimulants  Outcome: Progressing     Problem: Ineffective Coping  Goal: Participates in unit activities  Description: Interventions:  - Provide therapeutic environment   - Provide required programming   - Redirect inappropriate behaviors   Outcome: Not Progressing     Problem: JOSE  Goal: Will exhibit normal sleep and speech and no impulsivity  Description: INTERVENTIONS:  - Administer medication as ordered  - Set limits on impulsive behavior  - Make attempts to decrease external stimuli as possible  Outcome: Progressing     Problem: SAFETY ADULT  Goal: Patient will remain free of falls  Description: INTERVENTIONS:  - Educate patient/family on patient safety including physical limitations  - Instruct patient to call for assistance with activity   - Consult OT/PT to assist with strengthening/mobility   - Keep Call bell within reach  - Keep bed low and locked with side rails adjusted as appropriate  - Keep care items and personal belongings within reach  - Initiate and maintain comfort rounds  - Make Fall Risk Sign visible to staff  - Offer  Toileting every  Hours, in advance of need  - Initiate/Maintain alarm  - Obtain necessary fall risk management equipment:   - Apply yellow socks and bracelet for high fall risk patients  - Consider moving patient to room near nurses station  Outcome: Progressing     Problem: DISCHARGE PLANNING  Goal: Discharge to home or other facility with appropriate resources  Description: INTERVENTIONS:  - Identify barriers to discharge w/patient and caregiver  - Arrange for needed discharge resources and transportation as appropriate  - Identify discharge learning needs (meds, wound care, etc.)  - Arrange for interpretive services to assist at discharge as needed  - Refer to Case Management Department for coordinating discharge planning if the patient needs post-hospital services based on physician/advanced practitioner order or complex needs related to functional status, cognitive ability, or social support system  Outcome: Progressing

## 2024-10-25 NOTE — ASSESSMENT & PLAN NOTE
Admission labs reviewed, CBC, CMP, Vitamin D, Folate, HbA1c, lipid panel, TSH, UA acceptable  B12 low - initiate supplement  Coag profile, total syphilis labs pending  Vitals stable  UDS negative  COVID-19 negative  No EKG available  Medically stable for continued inpatient psychiatric treatment based on available results  Please contact [facility] with any questions or concerns

## 2024-10-25 NOTE — NURSING NOTE
@12:00 RN administered Atarax 50 mg PO for moderate anxiety. Upon follow-up, endorses medication ineffectiveness.

## 2024-10-25 NOTE — NURSING NOTE
@ 2129 pt requested and received trazodone 50 mg PO for sleep. @ 2133 pt requested and received atarax 100 mg PO for anxiety. Parker 25. Upon follow up pt is sleeping. PRNs were effective.
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
## 2024-10-25 NOTE — TREATMENT PLAN
TREATMENT PLAN REVIEW - Behavioral Health Tracee Walters 54 y.o. 1970 female MRN: [id number]    [facility] QU IP BEHAVIORAL HLTH Room / Bed: [facility] 213/[facility] 213-02 Encounter: [id number]          Admit Date/Time:  10/24/2024  6:21 PM    Treatment Team:   MD Sameer Pena MD Brianne Swope, AUSTIN Hutchins, AUSTIN Bloom [facility]  Tracee Kim, AUSTIN Resendiz, AUSTIN Bailon, AUSTIN Nicole RN    Diagnosis: Principal Problem:    Mood disorder (HCC)  Active Problems:    Anxiety disorder    Hirsutis    Medical clearance for psychiatric admission    Alcohol abuse    Seizure disorder (HCC)    Tobacco use      Patient Strengths/Assets: cooperative, motivated, negotiates basic needs, patient is on a voluntary commitment    Patient Barriers/Limitations: difficulty adapting, financial instability, homeless    Short Term Goals: decrease in depressive symptoms, decrease in anxiety symptoms, mood stabilization    Long Term Goals: improvement in depression, improvement in anxiety, resolution of depressive symptoms, resolution of manic symptoms, stabilization of mood    Progress Towards Goals: starting psychiatric medications as prescribed    Recommended Treatment: medication management, patient medication education, group therapy, milieu therapy, continued Behavioral Health psychiatric evaluation/assessment process    Treatment Frequency: daily medication monitoring, group and milieu therapy daily, monitoring through interdisciplinary rounds, monitoring through weekly patient care conferences    Expected Discharge Date:  5 to 7 days    Discharge Plan: referrals as indicated    Treatment Plan Created/Updated By: Luciano Enamorado DO

## 2024-10-25 NOTE — CONSULTS
Consultation - Hospitalist   Name: Tracee Walters 54 y.o. female I MRN: [id number]  Unit/Bed#: U 213-02 I Date of Admission: 10/24/2024   Date of Service: 10/25/2024 I Hospital Day: 1   Inpatient consult for Medical Clearance for  patient  Consult performed by: Betsy Lopez PA-C  Consult ordered by: Xiomy Moreno PA-C        Please see consult note dated 10/25
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
## 2024-10-25 NOTE — ASSESSMENT & PLAN NOTE
Patient drinks 2-3 fibrillar boxes of wine daily  Last drink was Wednesday evening, 10/23  100 for signs and symptoms of withdrawal  CIWA protocol  Does have seizure history, continue Keppra 500 mg twice daily
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
## 2024-10-26 RX ORDER — HYDROXYZINE HYDROCHLORIDE 50 MG/1
50 TABLET, FILM COATED ORAL 2 TIMES DAILY
Status: DISCONTINUED | OUTPATIENT
Start: 2024-10-26 | End: 2024-10-28 | Stop reason: HOSPADM

## 2024-10-26 RX ORDER — HYDROXYZINE HYDROCHLORIDE 50 MG/1
50 TABLET, FILM COATED ORAL 2 TIMES DAILY
Status: DISCONTINUED | OUTPATIENT
Start: 2024-10-26 | End: 2024-10-26

## 2024-10-26 RX ADMIN — NICOTINE 14 MG: 14 PATCH, EXTENDED RELEASE TRANSDERMAL at 09:02

## 2024-10-26 RX ADMIN — OLANZAPINE 7.5 MG: 5 TABLET, FILM COATED ORAL at 21:30

## 2024-10-26 RX ADMIN — HYDROXYZINE HYDROCHLORIDE 50 MG: 50 TABLET, FILM COATED ORAL at 09:01

## 2024-10-26 RX ADMIN — LEVETIRACETAM 500 MG: 500 TABLET, FILM COATED ORAL at 09:01

## 2024-10-26 RX ADMIN — HYDROXYZINE HYDROCHLORIDE 50 MG: 50 TABLET, FILM COATED ORAL at 21:30

## 2024-10-26 RX ADMIN — LIDOCAINE 2 PATCH: 50 PATCH CUTANEOUS at 09:02

## 2024-10-26 RX ADMIN — TRAZODONE HYDROCHLORIDE 50 MG: 50 TABLET ORAL at 21:30

## 2024-10-26 RX ADMIN — HYDROXYZINE HYDROCHLORIDE 50 MG: 50 TABLET, FILM COATED ORAL at 10:53

## 2024-10-26 RX ADMIN — ACETAMINOPHEN 975 MG: 325 TABLET, FILM COATED ORAL at 17:28

## 2024-10-26 RX ADMIN — MULTIPLE VITAMINS W/ MINERALS TAB 1 TABLET: TAB ORAL at 09:02

## 2024-10-26 RX ADMIN — LEVETIRACETAM 500 MG: 500 TABLET, FILM COATED ORAL at 21:31

## 2024-10-26 RX ADMIN — FOLIC ACID 1 MG: 1 TABLET ORAL at 09:01

## 2024-10-26 RX ADMIN — Medication 100 MG: at 09:05

## 2024-10-26 NOTE — NURSING NOTE
Pt is visible on unit, social with peers and attending groups.  Cooperative on approach.  Reports difficulty with sleep overnight d/t roommate being awake.  Pt expresses frustration with provider not prescribing meds discussed however felt better after writer reviewed meds with pt.  Denies SI/HI/AVH.  Reports main issue is sleep.

## 2024-10-26 NOTE — NURSING NOTE
Patient is at nursing station requesting atarax for c/o anxiety. Parker scale utilized and atarax administered at 2122. Upon reassessment, patient is resting in bed and reports improvement of anxiety. Prn effective.

## 2024-10-26 NOTE — PLAN OF CARE
Problem: Depression  Goal: Treatment Goal: Demonstrate behavioral control of depressive symptoms, verbalize feelings of improved mood/affect, and adopt new coping skills prior to discharge  Outcome: Progressing     Problem: Anxiety  Goal: Anxiety is at manageable level  Description: Interventions:  - Assess and monitor patient's anxiety level.   - Monitor for signs and symptoms (heart palpitations, chest pain, shortness of breath, headaches, nausea, feeling jumpy, restlessness, irritable, apprehensive).   - Collaborate with interdisciplinary team and initiate plan and interventions as ordered.  - [place] patient to unit/surroundings  - Explain treatment plan  - Encourage participation in care  - Encourage verbalization of concerns/fears  - Identify coping mechanisms  - Assist in developing anxiety-reducing skills  - Administer/offer alternative therapies  - Limit or eliminate stimulants  Outcome: Progressing     Problem: JOSE  Goal: Will exhibit normal sleep and speech and no impulsivity  Description: INTERVENTIONS:  - Administer medication as ordered  - Set limits on impulsive behavior  - Make attempts to decrease external stimuli as possible  Outcome: Progressing     Problem: SAFETY ADULT  Goal: Patient will remain free of falls  Description: INTERVENTIONS:  - Educate patient/family on patient safety including physical limitations  - Instruct patient to call for assistance with activity   - Consult OT/PT to assist with strengthening/mobility   - Keep Call bell within reach  - Keep bed low and locked with side rails adjusted as appropriate  - Keep care items and personal belongings within reach  - Initiate and maintain comfort rounds  - Make Fall Risk Sign visible to staff  - Apply yellow socks and bracelet for high fall risk patients  - Consider moving patient to room near nurses station  Outcome: Progressing

## 2024-10-26 NOTE — PROGRESS NOTES
Progress Note - Behavioral Health     Name: Tracee Walters 54 y.o. female I MRN: [id number]   Unit/Bed#: -02 I Date of Admission: 10/24/2024   Date of Service: 10/26/2024 I Hospital Day: 2         Assessment & Plan  Mood disorder (HCC)  Appears hypomanic, Zyprexa increased to 7.5 mg at bedtime.  Anxiety disorder  Atarax 50 mg twice a day added standing.  Can use additional PRNs if necessary.  Alcohol abuse  On CIWA.  Vitamins.  Encouraged cessation  Medical clearance for psychiatric admission  Done  Tobacco use  Encouraged cessation     Principal Problem:    Mood disorder (HCC)  Active Problems:    Anxiety disorder    Hirsutism    Medical clearance for psychiatric admission    Alcohol abuse    Seizure disorder (HCC)    Tobacco use       Recommended Treatment:     Planned medication and treatment changes:    All current active medications have been reviewed  Encourage group therapy, milieu therapy and occupational therapy  Behavioral Health checks every 15 minutes  On a 201 commitment status  Signed a 72-hour notice    Currently appears hypomanic, she is agreeable to increase Zyprexa to 7.5 mg at bedtime for mood stabilization.    Start Atarax 50 mg twice a day for anxiety.  Likely multifactorial in the setting of psychosocial stressors, decompensated mood disorder and chronic alcohol abuse.    Patient is unwilling to rescind her 72-hour notice and there do not appear to be sufficient medicolegal grounds for involuntary commitment as of today.    Current medications:  Current Facility-Administered Medications   Medication Dose Route Frequency Provider Last Rate    acetaminophen  650 mg Oral Q6H PRN Xiomy Moreno PA-C      acetaminophen  650 mg Oral Q4H PRN Xiomy Moreno PA-C      acetaminophen  975 mg Oral Q6H PRN Xiomy Moreno PA-C      aluminum-magnesium hydroxide-simethicone  30 mL Oral Q4H PRN Xiomy Moreno PA-C      benztropine  1 mg Intramuscular Q4H PRN Max 6/day Xiomy  Elvie Stives, PA-C      benztropine  1 mg Oral Q4H PRN Max 6/day Xiomy Elvie Stives, PA-C      bisacodyl  10 mg Rectal Daily PRN Xiomy Elvie Stives, PA-C      hydrOXYzine HCL  50 mg Oral Q6H PRN Max 4/day Xiomy Elvie Stives, PA-C      Or    diphenhydrAMINE  50 mg Intramuscular Q6H PRN Xiomy Elvie Stives, PA-C      folic acid  1 mg Oral Daily Rozina Duran MD      hydrOXYzine HCL  100 mg Oral Q6H PRN Max 4/day Xiomy Elvie Stives, PA-C      Or    LORazepam  2 mg Intramuscular Q6H PRN Xiomy Elvie Stives, PA-C      hydrOXYzine HCL  25 mg Oral Q6H PRN Max 4/day Xiomy Elvie Stives, PA-C      hydrOXYzine HCL  50 mg Oral BID [facility], PA-C      levETIRAcetam  500 mg Oral Q12H [facility] Xiomy Elvie Stives, PA-C      lidocaine  2 patch Topical Daily Betsy Lopez, PA-C      multivitamin-minerals  1 tablet Oral Daily Rozina Duran MD      nicotine  14 mg Transdermal Daily Betsy Lopez, ROSIE      nicotine polacrilex  4 mg Oral Q2H PRN Rozina Duran MD      OLANZapine  10 mg Oral Q3H PRN Max 3/day Xiomy Elvie Stives, PA-C      Or    OLANZapine  10 mg Intramuscular Q3H PRN Max 3/day Xiomy Elvie Stives, PA-C      OLANZapine  5 mg Oral Q3H PRN Max 6/day Xiomy Elvie Stives, PA-C      Or    OLANZapine  5 mg Intramuscular Q3H PRN Max 6/day Xiomy Elvie Stives, PA-C      OLANZapine  2.5 mg Oral Q3H PRN Max 8/day Xiomy Elvie Stives, PA-C      OLANZapine  7.5 mg Oral HS [facility], PA-C      polyethylene glycol  17 g Oral Daily PRN Xiomy Elvie Stives, PA-C      propranolol  10 mg Oral Q8H PRN Xiomy Elvie Stives, PA-C      senna-docusate sodium  1 tablet Oral Daily PRN Xiomy Elvie Stives, PA-C      Thiamine Mononitrate  100 mg Oral Daily Rozina Duran MD      traZODone  50 mg Oral HS PRN Xiomy Moreno PA-C      traZODone  50 mg Oral HS Luciano Enamorado DO         Behavior over the last 24 hours: unchanged.     Tracee is a 54-year-old female with a history of mood disorder who presents for  "psychiatric follow-up.  She signed a 72-hour notice requesting voluntary withdrawal from treatment yesterday.  Staff reports no issues overnight.  She is hypomanic appearing upon approach but redirectable; has been rather intrusive in the milieu.  Speech is hyperverbal and rapid.  She demonstrates a euphoric appearing and over bright affect with tangential thought processes, grandiosity and distractibility.  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  She plans on continuing the Zyprexa but is requesting medication for anxiety.  States the Atarax has been helpful and is wondering if that should be scheduled instead of PRN.  Currently denies SI and HI.  Currently denies AH and VH.    Sleep: slept better  Appetite: normal  Medication side effects: No   ROS: no complaints, all other systems are negative    Mental Status Evaluation:    Appearance: Casually dressed, appears stated age, appears consistent with stated age  Motor: +psychomotor agitation, no gait abnormalities  Behavior: superficially cooperative, requires repeated verbal redirection  Speech: Hyperverbal, pressured  Mood: \"I feel great!\"  Euphoric  Affect: expansive, labile  Thought Process: disorganized, tangential, grandiose  Thought Content: denies auditory hallucinations, denies visual hallucinations, denies delusions  Risk Potential: denies suicidal ideation, plan, or intent. Denies homicidal ideation  Sensorium: Oriented to person, place, time, and situation  Cognition: cognitive ability appears intact but was not quantitatively tested  Consciousness: alert and awake  Attention: currently impaired  Insight: limited  Judgement: limited    Vital signs in last 24 hours:    Temp:  [97.8 °F (36.6 °C)-98.4 °F (36.9 °C)] 98.4 °F (36.9 °C)  HR: "  [58-63] 60  BP: (107-115)/(62-89) 115/69  Resp:  [14-16] 16  SpO2:  [100 %] 100 %  O2 Device: None (Room air)    Laboratory results: I have personally reviewed all pertinent laboratory/tests results    Results from the past 24 hours: No results found for this or any previous visit (from the past 24 hour(s)).  Most Recent Labs:   Lab Results   Component Value Date    WBC 4.58 10/24/2024    RBC 4.43 10/24/2024    HGB 11.0 (L) 10/24/2024    HCT 37.7 10/24/2024     10/25/2024    RDW 19.0 (H) 10/24/2024    NEUTROABS 2.23 10/24/2024    SODIUM 141 10/24/2024    K 4.4 10/24/2024     10/24/2024    CO2 31 10/24/2024    BUN 10 10/24/2024    CREATININE 0.50 (L) 10/24/2024    GLUC 105 10/24/2024    CALCIUM 8.9 10/24/2024    AST 13 10/24/2024    ALT 8 10/24/2024    ALKPHOS 106 (H) 10/24/2024    TP 7.0 10/24/2024    ALB 4.4 10/24/2024    TBILI 0.29 10/24/2024    CHOLESTEROL 169 10/25/2024    HDL 92 10/25/2024    TRIG 49 10/25/2024    LDLCALC 67 10/25/2024    NONHDLC 77 10/25/2024    ZKM6ZWFCGLIJ 1.267 10/24/2024    [id number] 1.217 10/24/2024    SYPHILISAB Non-reactive 10/25/2024    HGBA1C 5.5 10/24/2024     10/24/2024       Progress Toward Goals: progressing slowly    Risks / Benefits of Treatment:    Risks, benefits, and possible side effects of medications explained to patient and patient verbalizes understanding and agreement for treatment.    Counseling / Coordination of Care:    Patient's progress discussed with staff in treatment team meeting.  Medications, treatment progress and treatment plan reviewed with patient.    Kristofer Monge PA-C 10/26/24    This note was constructed with the assistance of network approved dictation software. Please excuse any minor errors of syntax or grammar as a result.

## 2024-10-26 NOTE — NURSING NOTE
Patient reported elevated anxiety this morning and received Atarax 50mg PO at 0901. Upon reassessment patient reports medication was effective.

## 2024-10-27 RX ADMIN — MULTIPLE VITAMINS W/ MINERALS TAB 1 TABLET: TAB ORAL at 08:22

## 2024-10-27 RX ADMIN — TRAZODONE HYDROCHLORIDE 50 MG: 50 TABLET ORAL at 21:21

## 2024-10-27 RX ADMIN — FOLIC ACID 1 MG: 1 TABLET ORAL at 08:22

## 2024-10-27 RX ADMIN — NICOTINE 14 MG: 14 PATCH, EXTENDED RELEASE TRANSDERMAL at 08:21

## 2024-10-27 RX ADMIN — LEVETIRACETAM 500 MG: 500 TABLET, FILM COATED ORAL at 08:21

## 2024-10-27 RX ADMIN — LEVETIRACETAM 500 MG: 500 TABLET, FILM COATED ORAL at 21:18

## 2024-10-27 RX ADMIN — HYDROXYZINE HYDROCHLORIDE 50 MG: 50 TABLET, FILM COATED ORAL at 00:45

## 2024-10-27 RX ADMIN — ACETAMINOPHEN 975 MG: 325 TABLET, FILM COATED ORAL at 18:26

## 2024-10-27 RX ADMIN — HYDROXYZINE HYDROCHLORIDE 50 MG: 50 TABLET, FILM COATED ORAL at 13:20

## 2024-10-27 RX ADMIN — LIDOCAINE 2 PATCH: 50 PATCH CUTANEOUS at 11:14

## 2024-10-27 RX ADMIN — HYDROXYZINE HYDROCHLORIDE 50 MG: 50 TABLET, FILM COATED ORAL at 08:22

## 2024-10-27 RX ADMIN — TRAZODONE HYDROCHLORIDE 50 MG: 50 TABLET ORAL at 21:18

## 2024-10-27 RX ADMIN — Medication 100 MG: at 08:22

## 2024-10-27 RX ADMIN — OLANZAPINE 7.5 MG: 5 TABLET, FILM COATED ORAL at 21:21

## 2024-10-27 RX ADMIN — HYDROXYZINE HYDROCHLORIDE 50 MG: 50 TABLET, FILM COATED ORAL at 21:21

## 2024-10-27 NOTE — NURSING NOTE
Patient reported elevated anxiety. Patient presented with rapid speech and restlessness. Patient received Atarax 50mg PO at 1320. Patient reported medication was effective.

## 2024-10-27 NOTE — NURSING NOTE
Patient visible on the unit, social with peers and attending milieu activities. Pleasant and cooperative on approach. Denies depression and anxiety. Denies SI/HI/AVH. Endorses adequate appetite. Reports difficulty sleeping due to roommate being awake. Looking forward to discharge. Denies unmet needs at this time.
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
## 2024-10-27 NOTE — NURSING NOTE
Calm, cooperative, social with peers. Denies SI/HI/AVH and encouraged to approach staff if ideations occur. Informed RN that due to her broken heart syndrome, she feels the need to help everyone as well as be accepted by everyone. Feels overall improved and ready for discharge. Made progress towards treatment goals as evidenced by positive group attendance and positive peer interaction. Plan of care ongoing. Denies unmet needs.

## 2024-10-27 NOTE — PROGRESS NOTES
Progress Note - Behavioral Health     Name: Tracee Walters 54 y.o. female I MRN: [id number]   Unit/Bed#: -02 I Date of Admission: 10/24/2024   Date of Service: 10/27/2024 I Hospital Day: 3         Assessment & Plan  Mood disorder (HCC)  Appears hypomanic, Zyprexa increased to 7.5 mg at bedtime.  Anxiety disorder  Atarax 50 mg twice a day added standing.  Can use additional PRNs if necessary.  Alcohol abuse  On CIWA.  Vitamins.  Encouraged cessation  Medical clearance for psychiatric admission  Done  Tobacco use  Encouraged cessation     Principal Problem:    Mood disorder (HCC)  Active Problems:    Anxiety disorder    Hirsutism    Medical clearance for psychiatric admission    Alcohol abuse    Seizure disorder (HCC)    Tobacco use       Recommended Treatment:     Planned medication and treatment changes:    All current active medications have been reviewed  Encourage group therapy, milieu therapy and occupational therapy  Behavioral Health checks every 15 minutes  On a 201 commitment status  Signed a 72 hour notice    Continue current medications.  72-hour notice due to  tomorrow, likely no medicolegal grounds for involuntary commitment. Remains hypomanic but improved relative to prior to admission. Discharge planning for tomorrow.      Current medications:  Current Facility-Administered Medications   Medication Dose Route Frequency Provider Last Rate    acetaminophen  650 mg Oral Q6H PRN Xiomy Moreno, PA-CRUZ      acetaminophen  650 mg Oral Q4H PRN Xiomy Moreno, PA-C      acetaminophen  975 mg Oral Q6H PRN Xiomy Moreno, PA-C      aluminum-magnesium hydroxide-simethicone  30 mL Oral Q4H PRN Xiomy Moreno, PA-C      benztropine  1 mg Intramuscular Q4H PRN Max 6/day Xiomy Moreno, PA-C      benztropine  1 mg Oral Q4H PRN Max 6/day Xiomy Moreno, PA-C      bisacodyl  10 mg Rectal Daily PRN Xiomy Moreno, PA-C      hydrOXYzine HCL  50 mg Oral Q6H PRN Max 4/day  Xiomy Goodsonn Stives, PA-C      Or    diphenhydrAMINE  50 mg Intramuscular Q6H PRN Xiomy Elvie Stives, PA-C      folic acid  1 mg Oral Daily Rozina Duran MD      hydrOXYzine HCL  100 mg Oral Q6H PRN Max 4/day Xiomy Elvie Stives, PA-C      Or    LORazepam  2 mg Intramuscular Q6H PRN Xiomy Elvie Stives, PA-C      hydrOXYzine HCL  25 mg Oral Q6H PRN Max 4/day Xiomy Elvie Stives, PA-C      hydrOXYzine HCL  50 mg Oral BID [facility], PA-C      levETIRAcetam  500 mg Oral Q12H JACKIE Xiomy Elvie Stives, PA-C      lidocaine  2 patch Topical Daily Betsy Lopez, PA-C      multivitamin-minerals  1 tablet Oral Daily Rozina Duran MD      nicotine  14 mg Transdermal Daily Betsy Lopez, PA-C      nicotine polacrilex  4 mg Oral Q2H PRN Rozina Duran MD      OLANZapine  10 mg Oral Q3H PRN Max 3/day Xiomy Elvie Stives, PA-C      Or    OLANZapine  10 mg Intramuscular Q3H PRN Max 3/day Xiomy Elvie Stives, PA-C      OLANZapine  5 mg Oral Q3H PRN Max 6/day Xiomy Elvie Stives, PA-C      Or    OLANZapine  5 mg Intramuscular Q3H PRN Max 6/day Xiomy Elvie Stives, PA-C      OLANZapine  2.5 mg Oral Q3H PRN Max 8/day Xiomy Elvie Stives, PA-C      OLANZapine  7.5 mg Oral HS [facility], PA-C      polyethylene glycol  17 g Oral Daily PRN Xiomy Elvie Stives, PA-C      propranolol  10 mg Oral Q8H PRN Xiomy Elvie Stives, PA-C      senna-docusate sodium  1 tablet Oral Daily PRN Xiomy Elvie Stives, PA-C      Thiamine Mononitrate  100 mg Oral Daily Rozina Duran MD      traZODone  50 mg Oral HS PRN Xiomy [place] Stives, PA-C      traZODone  50 mg Oral HS Luciano Enamorado DO         Behavior over the last 24 hours: slowly improving.     Tracee is a 54-year-old female with a history of bipolar disorder who presents for psychiatric follow-up.  Staff reports no behavioral issues overnight.  Had difficulty sleeping secondary to roommate issues.  Remains hypertalkative upon approach but less loud, less pressured and less  "rapid.  Offers improving insight into her psychiatric condition, recognizes the need for medications as well as for maintaining sobriety.  She feels as though she has improved and would not like to rescind her 72-hour notice at this time.  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  She denies SI and HI.  Denies AH and VH.  Plans on continuing medications in the outpatient setting and attending follow-ups.  Has no plans to relapse with alcohol upon discharge, denies any cravings for EtOH.  Overall, the patient remains hypomanic but less so relative to earlier in her hospitalization.    Sleep: slept off and on, frequent awakenings  Appetite: normal  Medication side effects: No   ROS: no complaints, all other systems are negative    Mental Status Evaluation:    Appearance: Casually dressed, appears stated age, appears consistent with stated age  Motor: Less psychomotor agitation, no gait abnormalities  Behavior: More calm and cooperative, requires less frequent redirection  Speech: Hyperverbal, no longer pressured  Mood: \"I feel okay\"    Affect: Remains less expansive, labile  Thought Process: More organized, logical and linear but remains grandiose   Thought Content: denies auditory hallucinations, denies visual hallucinations, denies delusions  Risk Potential: denies suicidal ideation, plan, or intent. Denies homicidal ideation  Sensorium: Oriented to person, place, time, and situation  Cognition: cognitive ability appears intact but was not quantitatively tested  Consciousness: alert and awake  Attention: [place] than expected for age  Insight: limited but improving  Judgement: Fair    Vital signs in last 24 hours:    Temp:  [96.8 °F (36 °C)-97.2 °F (36.2 °C)] 97.2 °F (36.2 °C)  HR:  [77-96] 96  BP: ()/(75-82) 136/82  Resp:  [14-18] 18  SpO2:  [98 %-100 %] 100 %  O2 Device: None (Room " air)    Laboratory results: I have personally reviewed all pertinent laboratory/tests results    Results from the past 24 hours: No results found for this or any previous visit (from the past 24 hour(s)).  Most Recent Labs:   Lab Results   Component Value Date    WBC 4.58 10/24/2024    RBC 4.43 10/24/2024    HGB 11.0 (L) 10/24/2024    HCT 37.7 10/24/2024     10/25/2024    RDW 19.0 (H) 10/24/2024    NEUTROABS 2.23 10/24/2024    SODIUM 141 10/24/2024    K 4.4 10/24/2024     10/24/2024    CO2 31 10/24/2024    BUN 10 10/24/2024    CREATININE 0.50 (L) 10/24/2024    GLUC 105 10/24/2024    CALCIUM 8.9 10/24/2024    AST 13 10/24/2024    ALT 8 10/24/2024    ALKPHOS 106 (H) 10/24/2024    TP 7.0 10/24/2024    ALB 4.4 10/24/2024    TBILI 0.29 10/24/2024    CHOLESTEROL 169 10/25/2024    HDL 92 10/25/2024    TRIG 49 10/25/2024    LDLCALC 67 10/25/2024    NONHDLC 77 10/25/2024    [id number] 1.267 10/24/2024    [id number] 1.217 10/24/2024    SYPHILISAB Non-reactive 10/25/2024    HGBA1C 5.5 10/24/2024     10/24/2024       Progress Toward Goals: progressing, working on coping skills, discharge planning    Risks / Benefits of Treatment:    Risks, benefits, and possible side effects of medications explained to patient and patient verbalizes understanding and agreement for treatment.    Counseling / Coordination of Care:    Patient's progress discussed with staff in treatment team meeting.  Medications, treatment progress and treatment plan reviewed with patient.    Kristofer Monge PA-C 10/27/24    This note was constructed with the assistance of network approved dictation software. Please excuse any minor errors of syntax or grammar as a result.

## 2024-10-27 NOTE — NURSING NOTE
Pt req and given PRN Atarax at 0045 for moderate anxiety; pt presently laying still in bed, eyes closed, even respirations; evidently sleeping at this time.

## 2024-10-27 NOTE — TREATMENT TEAM
10/27/24 0900   Team Meeting   Meeting Type Daily Rounds   Team Members Present   Team Members Present Physician;Nurse   Physician Team Member Mamadou Monge   Nursing Team Member Uvaldo   Patient/Family Present   Patient Present No   Patient's Family Present No     Daily Rounds: Pt has 72 hr notice in place from 10/25/24 at 1300.  Denies SI.  Hyperverbal at times.  Difficulty sleeping at night d/t roommate being disrupted.    CIWA d/c'd.      Plan for discharge tomorrow.

## 2024-10-28 VITALS
OXYGEN SATURATION: 99 % | HEIGHT: 68 IN | TEMPERATURE: 97.5 F | SYSTOLIC BLOOD PRESSURE: 119 MMHG | BODY MASS INDEX: 21.98 KG/M2 | DIASTOLIC BLOOD PRESSURE: 62 MMHG | WEIGHT: 145 LBS | RESPIRATION RATE: 17 BRPM | HEART RATE: 61 BPM

## 2024-10-28 PROBLEM — Z00.8 MEDICAL CLEARANCE FOR PSYCHIATRIC ADMISSION: Status: RESOLVED | Noted: 2024-10-25 | Resolved: 2024-10-28

## 2024-10-28 PROCEDURE — 99239 HOSP IP/OBS DSCHRG MGMT >30: CPT | Performed by: PHYSICIAN ASSISTANT

## 2024-10-28 RX ORDER — LEVETIRACETAM 500 MG/1
500 TABLET ORAL EVERY 12 HOURS SCHEDULED
Qty: 60 TABLET | Refills: 2 | Status: SHIPPED | OUTPATIENT
Start: 2024-10-28 | End: 2024-10-28

## 2024-10-28 RX ORDER — OLANZAPINE 7.5 MG/1
7.5 TABLET, FILM COATED ORAL
Qty: 30 TABLET | Refills: 2 | Status: SHIPPED | OUTPATIENT
Start: 2024-10-28 | End: 2024-10-28

## 2024-10-28 RX ORDER — HYDROXYZINE HYDROCHLORIDE 50 MG/1
50 TABLET, FILM COATED ORAL 2 TIMES DAILY
Qty: 28 TABLET | Refills: 0 | Status: SHIPPED | OUTPATIENT
Start: 2024-10-28 | End: 2024-11-11

## 2024-10-28 RX ORDER — LEVETIRACETAM 500 MG/1
500 TABLET ORAL EVERY 12 HOURS SCHEDULED
Qty: 28 TABLET | Refills: 0 | Status: SHIPPED | OUTPATIENT
Start: 2024-10-28 | End: 2024-11-11

## 2024-10-28 RX ORDER — TRAZODONE HYDROCHLORIDE 50 MG/1
50 TABLET, FILM COATED ORAL
Qty: 30 TABLET | Refills: 2 | Status: SHIPPED | OUTPATIENT
Start: 2024-10-28 | End: 2024-10-28

## 2024-10-28 RX ORDER — HYDROXYZINE HYDROCHLORIDE 50 MG/1
50 TABLET, FILM COATED ORAL 2 TIMES DAILY
Qty: 60 TABLET | Refills: 2 | Status: SHIPPED | OUTPATIENT
Start: 2024-10-28 | End: 2024-10-28

## 2024-10-28 RX ORDER — OLANZAPINE 7.5 MG/1
7.5 TABLET, FILM COATED ORAL
Qty: 14 TABLET | Refills: 0 | Status: SHIPPED | OUTPATIENT
Start: 2024-10-28 | End: 2024-11-11

## 2024-10-28 RX ORDER — TRAZODONE HYDROCHLORIDE 50 MG/1
50 TABLET, FILM COATED ORAL
Qty: 14 TABLET | Refills: 0 | Status: SHIPPED | OUTPATIENT
Start: 2024-10-28 | End: 2024-11-11

## 2024-10-28 RX ADMIN — NICOTINE 14 MG: 14 PATCH, EXTENDED RELEASE TRANSDERMAL at 08:56

## 2024-10-28 RX ADMIN — LIDOCAINE 2 PATCH: 50 PATCH CUTANEOUS at 08:58

## 2024-10-28 RX ADMIN — HYDROXYZINE HYDROCHLORIDE 50 MG: 50 TABLET, FILM COATED ORAL at 08:55

## 2024-10-28 RX ADMIN — MULTIPLE VITAMINS W/ MINERALS TAB 1 TABLET: TAB ORAL at 08:55

## 2024-10-28 RX ADMIN — LEVETIRACETAM 500 MG: 500 TABLET, FILM COATED ORAL at 08:55

## 2024-10-28 RX ADMIN — FOLIC ACID 1 MG: 1 TABLET ORAL at 08:55

## 2024-10-28 RX ADMIN — Medication 100 MG: at 09:32

## 2024-10-28 RX ADMIN — HYDROXYZINE HYDROCHLORIDE 50 MG: 50 TABLET, FILM COATED ORAL at 10:32

## 2024-10-28 NOTE — PLAN OF CARE
Patient regularly attends groups and other unit activities.     Problem: Ineffective Coping  Goal: Participates in unit activities  Description: Interventions:  - Provide therapeutic environment   - Provide required programming   - Redirect inappropriate behaviors   Outcome: Adequate for Discharge

## 2024-10-28 NOTE — DISCHARGE INSTR - APPOINTMENTS
You will be discharged to [address] [place] [id number]     You will be discharged with a 1 week supply of your medications.       Behavioral Health Nurse Navigator, Rivka or  will be calling you after your discharge, on the phone number that you provided.  They will be available as an additional support, if needed.   If you wish to speak with Rivka, you may contact her at [phone].
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
## 2024-10-28 NOTE — BH TRANSITION RECORD
Contact Information: If you have any questions, concerns, pended studies, tests and/or procedures, or emergencies regarding your inpatient behavioral health visit. Please contact [place] [facility] [phone] and ask to speak to a , nurse or physician. A contact is available 24 hours/ 7 days a week at this number.     Summary of Procedures Performed During your Stay:  Below is a list of major procedures performed during your hospital stay and a summary of results:  - No major procedures performed.    Pending Studies (From admission, onward)      None          Please follow up on the above pending studies with your PCP and/or referring provider.

## 2024-10-28 NOTE — PROGRESS NOTES
Diagnosis of Mood disorder reviewed.   Short term goals for decrease in depressive symptoms, decrease in anxiety symptoms, mood stabilization discussed.   All present parties in agreement and treatment plan signed.    10/25/24 1540   Team Meeting   Meeting Type Tx Team Meeting   Team Members Present   Team Members Present Physician;Nurse;   Physician Team Member Fei   Nursing Team Member Bonnie   Care Management Team Member Rufina   Patient/Family Present   Patient Present No  (Pt declined to meet with treatment team)   Patient's Family Present No

## 2024-10-28 NOTE — PROGRESS NOTES
BAT on 10/23/24 - 0.231   Audit - 29   PAWSS - 3     Pt reported increase in drinking Wine due to life stressors. Pt reported she has been binge drinking.     Pt declined referral for Inpatient D&A Rehab and OP D&A services.     Pt open to  OP referral to [facility] funded outpatient as pt does not have insurance.     CM to provide pt with resources for D&A if she changes her mind.

## 2024-10-28 NOTE — NURSING NOTE
Reassessment after administration of Tylenol for generalized pain. Patient denies pain at this time. Intervention effective.
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
## 2024-10-28 NOTE — CASE MANAGEMENT
YANN met with pt to check in about dc plans for today. Pt reported feeling ready and improved. CM informed her that CM will refer her to [facility] and they will contact her after dc. Pt will also dc with week supply of her medications. Pt verbalized understanding.     Pt reported she would like to dc to:   [address] [place] [id number]     YANN scheduled transportation for 12:15pm to above address.

## 2024-10-28 NOTE — PLAN OF CARE
Problem: Depression  Goal: Treatment Goal: Demonstrate behavioral control of depressive symptoms, verbalize feelings of improved mood/affect, and adopt new coping skills prior to discharge  Outcome: Adequate for Discharge     Problem: Anxiety  Goal: Anxiety is at manageable level  Description: Interventions:  - Assess and monitor patient's anxiety level.   - Monitor for signs and symptoms (heart palpitations, chest pain, shortness of breath, headaches, nausea, feeling jumpy, restlessness, irritable, apprehensive).   - Collaborate with interdisciplinary team and initiate plan and interventions as ordered.  - [place] patient to unit/surroundings  - Explain treatment plan  - Encourage participation in care  - Encourage verbalization of concerns/fears  - Identify coping mechanisms  - Assist in developing anxiety-reducing skills  - Administer/offer alternative therapies  - Limit or eliminate stimulants  Outcome: Adequate for Discharge     Problem: JOSE  Goal: Will exhibit normal sleep and speech and no impulsivity  Description: INTERVENTIONS:  - Administer medication as ordered  - Set limits on impulsive behavior  - Make attempts to decrease external stimuli as possible  Outcome: Adequate for Discharge     Problem: SAFETY ADULT  Goal: Patient will remain free of falls  Description: INTERVENTIONS:  - Educate patient/family on patient safety including physical limitations  - Instruct patient to call for assistance with activity   - Consult OT/PT to assist with strengthening/mobility   - Keep Call bell within reach  - Keep bed low and locked with side rails adjusted as appropriate  - Keep care items and personal belongings within reach  - Initiate and maintain comfort rounds  - Make Fall Risk Sign visible to staff  - Offer Toileting every  Hours, in advance of need  - Initiate/Maintain alarm  - Obtain necessary fall risk management equipment:   - Apply yellow socks and bracelet for high fall risk patients  - Consider  moving patient to room near nurses station  Outcome: Adequate for Discharge     Problem: Ineffective Coping  Goal: Participates in unit activities  Description: Interventions:  - Provide therapeutic environment   - Provide required programming   - Redirect inappropriate behaviors   Outcome: Adequate for Discharge     Problem: DISCHARGE PLANNING  Goal: Discharge to home or other facility with appropriate resources  Description: INTERVENTIONS:  - Identify barriers to discharge w/patient and caregiver  - Arrange for needed discharge resources and transportation as appropriate  - Identify discharge learning needs (meds, wound care, etc.)  - Arrange for interpretive services to assist at discharge as needed  - Refer to Case Management Department for coordinating discharge planning if the patient needs post-hospital services based on physician/advanced practitioner order or complex needs related to functional status, cognitive ability, or social support system  Outcome: Adequate for Discharge

## 2024-10-28 NOTE — PLAN OF CARE
Problem: Depression  Goal: Treatment Goal: Demonstrate behavioral control of depressive symptoms, verbalize feelings of improved mood/affect, and adopt new coping skills prior to discharge  Outcome: Progressing     Problem: Anxiety  Goal: Anxiety is at manageable level  Description: Interventions:  - Assess and monitor patient's anxiety level.   - Monitor for signs and symptoms (heart palpitations, chest pain, shortness of breath, headaches, nausea, feeling jumpy, restlessness, irritable, apprehensive).   - Collaborate with interdisciplinary team and initiate plan and interventions as ordered.  - [place] patient to unit/surroundings  - Explain treatment plan  - Encourage participation in care  - Encourage verbalization of concerns/fears  - Identify coping mechanisms  - Assist in developing anxiety-reducing skills  - Administer/offer alternative therapies  - Limit or eliminate stimulants  Outcome: Progressing     Problem: JOSE  Goal: Will exhibit normal sleep and speech and no impulsivity  Description: INTERVENTIONS:  - Administer medication as ordered  - Set limits on impulsive behavior  - Make attempts to decrease external stimuli as possible  Outcome: Progressing     Problem: SAFETY ADULT  Goal: Patient will remain free of falls  Description: INTERVENTIONS:  - Educate patient/family on patient safety including physical limitations  - Instruct patient to call for assistance with activity   - Consult OT/PT to assist with strengthening/mobility   - Keep Call bell within reach  - Keep bed low and locked with side rails adjusted as appropriate  - Keep care items and personal belongings within reach  - Initiate and maintain comfort rounds  - Make Fall Risk Sign visible to staff  - Apply yellow socks and bracelet for high fall risk patients  - Consider moving patient to room near nurses station  Outcome: Progressing

## 2024-10-28 NOTE — CASE MANAGEMENT
CM called [facility] ([phone]) to refer pt for [facility] funded Outpatient Therapy and Medication Management. They will send to their intake department and they will contact pt in a week or two to schedule.

## 2024-10-28 NOTE — NURSING NOTE
"Tracee expresses readiness for discharge. Denies SI/HI/AVH. She expressed need for medications to be filled prior to discharge because she \"has no car to pick any up when I go home.\" AVS reviewed  "

## 2024-10-28 NOTE — DISCHARGE INSTR - OTHER ORDERS
[facility] CRISIS INFORMATION   The PEER LINE is a toll-free telephone number for people in [facility] who are seeking a listening ear for additional support in their recovery from mental illness.  The PEER LINE is peer-run and peer-friendly.   You can call the Peer Line 24 hours a day. Phone: 5-109-MB-PEERS /([phone])   Emergency Services  [facility] Emergency Services: [phone]  [address] [place] [address]     Text CONNECT to 887447 from anywhere in the [place], anytime, about any type of crisis.  A live, trained Crisis Counselor receives the text and lets you know that they are here to listen.  The volunteer Crisis Counselor will help you move from a hot moment to a cool moment.    Warm Line: [phone], [phone], [phone]  If it is not quite a crisis, but you want to talk to someone, 24 hours/day, 7 days/week:  Someone to listen; someone who cares.    The National [place] [employer] Mental Illness (IVANA) offers various education & support groups for you & your family.  For more information visit their website at   http://www.ivana-lv.org/.    Dial 2-1-1 to get connected/get help.  Free, confidential information & referral available 24/7: Aging Services, Child & Youth Services, Counseling, Education/Training, Food/Shelter/Clothing, Health Services, Parenting, Substance Abuse, Support Groups, Volunteer Opportunities, & much more.  Phone: 2-1-1 or 040-440-2720, Web: www.[employer], Email: 211@[facility].org      [facility] provides funding to support multiple Recovery Centers in [facility].   These centers offer a safe, sober environment to those in recovery. A variety of programming including 12-Step Meetings, Yoga, Karaoke, Life Skills Workshops, etc. is offered at each location.    [facility]  [address] [place] [address] [phone]  [email or web address]      [facility]  [address] [place]  [address]  [phone]    [place]  [address] [place] [address]  [phone]  treatmenttrends.org/index.php/programs/hope-center    [facility]  Nurturing families impacted by substance use  [address] [place] [address]  [phone]  www.[email or web address]    Recovery Center  [address] [place] [address]  [phone]  [facility]       [facility] on [address] [place] [address]  [address] [address]  [phone]  Hours  9:00 am to 5:00 pm Monday thru Friday      Abstinence from addiction is only the beginning.  [facility] residents are encouraged to pursue meaningful lives with purpose at the [facility] on [address] [place] [address]. We provide a safe and sober environment which is staffed by people in recovery, who share similar experiences, and are willing to listen and assist people in early recovery. It takes a community to support the recovery process. This [facility] initiative recognizes and supports the efforts and investment of those personally in recovery as well as those supporting their efforts.    Our [place]  The [facility] [address] [place] [address] offers a safe environment to those seeking recovery and/or who are part of the treatment continuum.    Although we are not a treatment facility, we are able to assist those in need, refer members of the center to community resources as needed. We encourage and guide members to pursue meaningful lives with purpose at the [facility] on [address] [place] [address] .    Our hope is that they will find inspiration, encouragement, support through the examples of our volunteers and staff at the center. It takes effort and a dedicated community to support the recovery process.

## 2024-10-29 NOTE — PROGRESS NOTES
10/28/24 0752   Team Meeting   Meeting Type Daily Rounds   Team Members Present   Team Members Present Physician;Nurse;;Other (Discipline and Name)   Physician Team Member Dr. Enamorado / ROSIE Moreno / ROSIE Monge / [place] Student   Nursing Team Member Evert   Care Management Team Member Rufina / Griselda / Zak / Yannick   Other (Discipline and Name) Sigmund - Group Facilitator   Patient/Family Present   Patient Present No   Patient's Family Present No     Treatment Team Rounds Completed  Medical and Psychiatric Review Completed  D/C: Today - Pt denies all symptoms, less manic. Ready for dc.

## 2024-10-29 NOTE — PROGRESS NOTES
10/25/24 0755   Team Meeting   Meeting Type Daily Rounds   Team Members Present   Team Members Present Physician;Nurse;;Other (Discipline and Name)   Physician Team Member Dr. Enamorado / ROSIE Moreno   Nursing Team Member Astrid / Ravinder   Care Management Team Member Rufina / Griselda   Patient/Family Present   Patient Present No   Patient's Family Present No     Treatment Team Rounds Completed  Medical and Psychiatric Review Completed  D/C: New admission - 201 from [place] ED. Patient brought via EMS from [place] due to intoxication. Pt was at [employer] and was intoxicated and EMS was called. Continue to monitor pt and medications.